# Patient Record
Sex: FEMALE | Race: WHITE | URBAN - METROPOLITAN AREA
[De-identification: names, ages, dates, MRNs, and addresses within clinical notes are randomized per-mention and may not be internally consistent; named-entity substitution may affect disease eponyms.]

---

## 2023-10-30 ENCOUNTER — LAB OUTSIDE AN ENCOUNTER (OUTPATIENT)
Dept: URBAN - METROPOLITAN AREA CLINIC 23 | Facility: CLINIC | Age: 58
End: 2023-10-30

## 2023-10-30 ENCOUNTER — OFFICE VISIT (OUTPATIENT)
Dept: URBAN - METROPOLITAN AREA CLINIC 23 | Facility: CLINIC | Age: 58
End: 2023-10-30
Payer: COMMERCIAL

## 2023-10-30 VITALS
DIASTOLIC BLOOD PRESSURE: 76 MMHG | SYSTOLIC BLOOD PRESSURE: 124 MMHG | HEIGHT: 65 IN | BODY MASS INDEX: 27.86 KG/M2 | HEART RATE: 65 BPM | TEMPERATURE: 97.7 F | WEIGHT: 167.2 LBS

## 2023-10-30 DIAGNOSIS — Z80.0 FAMILY HX OF COLON CANCER: ICD-10-CM

## 2023-10-30 DIAGNOSIS — Z86.010 HISTORY OF COLON POLYPS: ICD-10-CM

## 2023-10-30 PROCEDURE — 99203 OFFICE O/P NEW LOW 30 MIN: CPT | Performed by: INTERNAL MEDICINE

## 2023-10-30 RX ORDER — SODIUM, POTASSIUM,MAG SULFATES 17.5-3.13G
AS DIRECTED. THIS IS A GENERIC FOR SUPREP SOLUTION, RECONSTITUTED, ORAL ORAL ONCE
Qty: 1 KIT | Refills: 0 | OUTPATIENT
Start: 2023-10-30 | End: 2023-10-31

## 2023-10-30 RX ORDER — ATORVASTATIN CALCIUM 10 MG/1
1 TABLET TABLET, FILM COATED ORAL ONCE A DAY
Status: ACTIVE | COMMUNITY

## 2023-10-30 NOTE — PREVIOUS WORKUP REVIEWED
REVIEWED EXTERNAL MEDICAL RECORD  ENDOSCOPIES: -Colonoscopy 6/13/2018: Hemorrhoids.  Normal TI.  A 4 mm polyp in the sigmoid colon.  A 5 mm polyp in the rectosigmoid colon.  2 polyps in the rectum, 3-4 mm in size.  Diverticulosis in the sigmoid colon.  Internal hemorrhoids.  Repeat colonoscopy in 5 years. *Pathology: Sigmoid colon and rectum polyp-hyperplastic polyp.  Sigmoid colon polyp-hyperplastic polyp.  Rectal polyp-hyperplastic/trauma/prolapse changes. -Colonoscopy 3/29/2012: A 7 mm polyp in the cecum.  A 10 mm sessile polyp in the descending colon.  Internal hemorrhoids. *Pathology: Cecal polyp-hyperplastic polyp.  Descending colon polyp-hyperplastic polyp.  LABS:  -Labs 6/12/2023: BUN 15, creatinine 0.64, total bilirubin 0.4, alkaline phosphatase 75, AST 15, ALT 16, WBC 8, hemoglobin 14.8, platelet 337.  IMAGES: ,

## 2023-10-30 NOTE — HPI-TODAY'S VISIT:
Previous colon cancer screening/colonoscopy: 5 years ago. Family history of colon cancer: Father at age 70s, mother at age 80s, brother-Crohn's Blood in stool: frequent, on TP, blaming hemorrhoids.  Weight loss: No. Change in bowel habit: No. Constipation: Intermittent. DEMARIO RIVERA.

## 2023-11-13 ENCOUNTER — DASHBOARD ENCOUNTERS (OUTPATIENT)
Age: 58
End: 2023-11-13

## 2023-12-13 ENCOUNTER — OFFICE VISIT (OUTPATIENT)
Dept: URBAN - METROPOLITAN AREA SURGERY CENTER 15 | Facility: SURGERY CENTER | Age: 58
End: 2023-12-13

## 2024-02-21 ENCOUNTER — LAB (OUTPATIENT)
Dept: URBAN - METROPOLITAN AREA CLINIC 4 | Facility: CLINIC | Age: 59
End: 2024-02-21
Payer: COMMERCIAL

## 2024-02-21 ENCOUNTER — COLON (OUTPATIENT)
Dept: URBAN - METROPOLITAN AREA SURGERY CENTER 15 | Facility: SURGERY CENTER | Age: 59
End: 2024-02-21
Payer: COMMERCIAL

## 2024-02-21 DIAGNOSIS — Z86.010 ADENOMAS PERSONAL HISTORY OF COLONIC POLYPS: ICD-10-CM

## 2024-02-21 DIAGNOSIS — K62.1 ANAL AND RECTAL POLYP: ICD-10-CM

## 2024-02-21 DIAGNOSIS — K63.89 OTHER SPECIFIED DISEASES OF INTESTINE: ICD-10-CM

## 2024-02-21 DIAGNOSIS — K63.5 BENIGN COLON POLYP: ICD-10-CM

## 2024-02-21 DIAGNOSIS — Z12.11 COLON CANCER SCREENING: ICD-10-CM

## 2024-02-21 PROCEDURE — 88305 TISSUE EXAM BY PATHOLOGIST: CPT | Performed by: PATHOLOGY

## 2024-02-21 PROCEDURE — 45385 COLONOSCOPY W/LESION REMOVAL: CPT | Performed by: INTERNAL MEDICINE

## 2024-02-21 RX ORDER — ATORVASTATIN CALCIUM 10 MG/1
1 TABLET TABLET, FILM COATED ORAL ONCE A DAY
Status: ACTIVE | COMMUNITY